# Patient Record
Sex: MALE | Race: WHITE | NOT HISPANIC OR LATINO | Employment: OTHER | URBAN - METROPOLITAN AREA
[De-identification: names, ages, dates, MRNs, and addresses within clinical notes are randomized per-mention and may not be internally consistent; named-entity substitution may affect disease eponyms.]

---

## 2023-05-12 LAB
ALANINE AMINOTRANSFERASE (SGPT) (U/L) IN SER/PLAS: 19 U/L (ref 10–52)
ALBUMIN (G/DL) IN SER/PLAS: 4.3 G/DL (ref 3.4–5)
ALKALINE PHOSPHATASE (U/L) IN SER/PLAS: 53 U/L (ref 33–136)
ANION GAP IN SER/PLAS: 12 MMOL/L (ref 10–20)
ASPARTATE AMINOTRANSFERASE (SGOT) (U/L) IN SER/PLAS: 20 U/L (ref 9–39)
BILIRUBIN TOTAL (MG/DL) IN SER/PLAS: 0.6 MG/DL (ref 0–1.2)
CALCIUM (MG/DL) IN SER/PLAS: 9.8 MG/DL (ref 8.6–10.3)
CARBON DIOXIDE, TOTAL (MMOL/L) IN SER/PLAS: 26 MMOL/L (ref 21–32)
CHLORIDE (MMOL/L) IN SER/PLAS: 106 MMOL/L (ref 98–107)
CHOLESTEROL (MG/DL) IN SER/PLAS: 172 MG/DL (ref 0–199)
CHOLESTEROL IN HDL (MG/DL) IN SER/PLAS: 47.8 MG/DL
CHOLESTEROL/HDL RATIO: 3.6
CREATININE (MG/DL) IN SER/PLAS: 0.99 MG/DL (ref 0.5–1.3)
ERYTHROCYTE DISTRIBUTION WIDTH (RATIO) BY AUTOMATED COUNT: 13.2 % (ref 11.5–14.5)
ERYTHROCYTE MEAN CORPUSCULAR HEMOGLOBIN CONCENTRATION (G/DL) BY AUTOMATED: 30.9 G/DL (ref 32–36)
ERYTHROCYTE MEAN CORPUSCULAR VOLUME (FL) BY AUTOMATED COUNT: 90 FL (ref 80–100)
ERYTHROCYTES (10*6/UL) IN BLOOD BY AUTOMATED COUNT: 5.41 X10E12/L (ref 4.5–5.9)
GFR MALE: 79 ML/MIN/1.73M2
GLUCOSE (MG/DL) IN SER/PLAS: 76 MG/DL (ref 74–99)
HEMATOCRIT (%) IN BLOOD BY AUTOMATED COUNT: 48.9 % (ref 41–52)
HEMOGLOBIN (G/DL) IN BLOOD: 15.1 G/DL (ref 13.5–17.5)
LDL: 90 MG/DL (ref 0–99)
LEUKOCYTES (10*3/UL) IN BLOOD BY AUTOMATED COUNT: 6.5 X10E9/L (ref 4.4–11.3)
PLATELETS (10*3/UL) IN BLOOD AUTOMATED COUNT: 288 X10E9/L (ref 150–450)
POTASSIUM (MMOL/L) IN SER/PLAS: 4.3 MMOL/L (ref 3.5–5.3)
PROSTATE SPECIFIC ANTIGEN,SCREEN: <0.01 NG/ML (ref 0–4)
PROTEIN TOTAL: 7.4 G/DL (ref 6.4–8.2)
SODIUM (MMOL/L) IN SER/PLAS: 140 MMOL/L (ref 136–145)
TRIGLYCERIDE (MG/DL) IN SER/PLAS: 172 MG/DL (ref 0–149)
UREA NITROGEN (MG/DL) IN SER/PLAS: 16 MG/DL (ref 6–23)
VLDL: 34 MG/DL (ref 0–40)

## 2024-04-12 ENCOUNTER — TELEPHONE (OUTPATIENT)
Dept: PRIMARY CARE | Facility: CLINIC | Age: 79
End: 2024-04-12
Payer: MEDICARE

## 2024-04-12 DIAGNOSIS — Z13.220 SCREENING FOR LIPID DISORDERS: ICD-10-CM

## 2024-04-12 DIAGNOSIS — Z85.46 HISTORY OF PROSTATE CANCER: ICD-10-CM

## 2024-04-12 DIAGNOSIS — Z00.00 HEALTHCARE MAINTENANCE: ICD-10-CM

## 2024-05-22 ENCOUNTER — LAB (OUTPATIENT)
Dept: LAB | Facility: LAB | Age: 79
End: 2024-05-22
Payer: MEDICARE

## 2024-05-22 DIAGNOSIS — Z85.46 HISTORY OF PROSTATE CANCER: ICD-10-CM

## 2024-05-22 DIAGNOSIS — Z13.220 SCREENING FOR LIPID DISORDERS: ICD-10-CM

## 2024-05-22 DIAGNOSIS — Z00.00 HEALTHCARE MAINTENANCE: ICD-10-CM

## 2024-05-22 LAB
ALBUMIN SERPL BCP-MCNC: 4.4 G/DL (ref 3.4–5)
ALP SERPL-CCNC: 53 U/L (ref 33–136)
ALT SERPL W P-5'-P-CCNC: 17 U/L (ref 10–52)
ANION GAP SERPL CALC-SCNC: 13 MMOL/L (ref 10–20)
AST SERPL W P-5'-P-CCNC: 18 U/L (ref 9–39)
BILIRUB SERPL-MCNC: 0.5 MG/DL (ref 0–1.2)
BUN SERPL-MCNC: 20 MG/DL (ref 6–23)
CALCIUM SERPL-MCNC: 9.4 MG/DL (ref 8.6–10.3)
CHLORIDE SERPL-SCNC: 106 MMOL/L (ref 98–107)
CHOLEST SERPL-MCNC: 181 MG/DL (ref 0–199)
CHOLESTEROL/HDL RATIO: 4.5
CO2 SERPL-SCNC: 26 MMOL/L (ref 21–32)
CREAT SERPL-MCNC: 0.96 MG/DL (ref 0.5–1.3)
EGFRCR SERPLBLD CKD-EPI 2021: 81 ML/MIN/1.73M*2
ERYTHROCYTE [DISTWIDTH] IN BLOOD BY AUTOMATED COUNT: 12.9 % (ref 11.5–14.5)
GLUCOSE SERPL-MCNC: 77 MG/DL (ref 74–99)
HCT VFR BLD AUTO: 50.1 % (ref 41–52)
HDLC SERPL-MCNC: 40.3 MG/DL
HGB BLD-MCNC: 15.5 G/DL (ref 13.5–17.5)
LDLC SERPL CALC-MCNC: 111 MG/DL
MCH RBC QN AUTO: 28.7 PG (ref 26–34)
MCHC RBC AUTO-ENTMCNC: 30.9 G/DL (ref 32–36)
MCV RBC AUTO: 93 FL (ref 80–100)
NON HDL CHOLESTEROL: 141 MG/DL (ref 0–149)
NRBC BLD-RTO: 0 /100 WBCS (ref 0–0)
PLATELET # BLD AUTO: 339 X10*3/UL (ref 150–450)
POTASSIUM SERPL-SCNC: 4.7 MMOL/L (ref 3.5–5.3)
PROT SERPL-MCNC: 6.8 G/DL (ref 6.4–8.2)
PSA SERPL-MCNC: 0.01 NG/ML
RBC # BLD AUTO: 5.4 X10*6/UL (ref 4.5–5.9)
SODIUM SERPL-SCNC: 140 MMOL/L (ref 136–145)
TRIGL SERPL-MCNC: 151 MG/DL (ref 0–149)
VLDL: 30 MG/DL (ref 0–40)
WBC # BLD AUTO: 5.8 X10*3/UL (ref 4.4–11.3)

## 2024-05-22 PROCEDURE — 80053 COMPREHEN METABOLIC PANEL: CPT

## 2024-05-22 PROCEDURE — 80061 LIPID PANEL: CPT

## 2024-05-22 PROCEDURE — 85027 COMPLETE CBC AUTOMATED: CPT

## 2024-05-22 PROCEDURE — 36415 COLL VENOUS BLD VENIPUNCTURE: CPT

## 2024-05-22 PROCEDURE — G0103 PSA SCREENING: HCPCS

## 2024-05-28 ENCOUNTER — APPOINTMENT (OUTPATIENT)
Dept: PRIMARY CARE | Facility: CLINIC | Age: 79
End: 2024-05-28
Payer: MEDICARE

## 2024-05-29 ENCOUNTER — OFFICE VISIT (OUTPATIENT)
Dept: PRIMARY CARE | Facility: CLINIC | Age: 79
End: 2024-05-29
Payer: MEDICARE

## 2024-05-29 VITALS
SYSTOLIC BLOOD PRESSURE: 138 MMHG | WEIGHT: 194 LBS | HEIGHT: 74 IN | OXYGEN SATURATION: 99 % | BODY MASS INDEX: 24.9 KG/M2 | DIASTOLIC BLOOD PRESSURE: 80 MMHG | HEART RATE: 75 BPM

## 2024-05-29 DIAGNOSIS — Z13.6 ENCOUNTER FOR SCREENING FOR CORONARY ARTERY DISEASE: ICD-10-CM

## 2024-05-29 DIAGNOSIS — E78.00 PURE HYPERCHOLESTEROLEMIA: ICD-10-CM

## 2024-05-29 DIAGNOSIS — R03.0 ELEVATED BP WITHOUT DIAGNOSIS OF HYPERTENSION: ICD-10-CM

## 2024-05-29 DIAGNOSIS — Z00.00 ROUTINE GENERAL MEDICAL EXAMINATION AT A HEALTH CARE FACILITY: Primary | ICD-10-CM

## 2024-05-29 PROCEDURE — 1036F TOBACCO NON-USER: CPT | Performed by: FAMILY MEDICINE

## 2024-05-29 PROCEDURE — G0439 PPPS, SUBSEQ VISIT: HCPCS | Performed by: FAMILY MEDICINE

## 2024-05-29 PROCEDURE — 1158F ADVNC CARE PLAN TLK DOCD: CPT | Performed by: FAMILY MEDICINE

## 2024-05-29 PROCEDURE — 1170F FXNL STATUS ASSESSED: CPT | Performed by: FAMILY MEDICINE

## 2024-05-29 PROCEDURE — 1123F ACP DISCUSS/DSCN MKR DOCD: CPT | Performed by: FAMILY MEDICINE

## 2024-05-29 PROCEDURE — 99214 OFFICE O/P EST MOD 30 MIN: CPT | Performed by: FAMILY MEDICINE

## 2024-05-29 PROCEDURE — 1159F MED LIST DOCD IN RCRD: CPT | Performed by: FAMILY MEDICINE

## 2024-05-29 RX ORDER — MULTIVITAMIN
1 TABLET ORAL
COMMUNITY

## 2024-05-29 ASSESSMENT — ACTIVITIES OF DAILY LIVING (ADL)
BATHING: INDEPENDENT
TAKING_MEDICATION: INDEPENDENT
DOING_HOUSEWORK: INDEPENDENT
MANAGING_FINANCES: INDEPENDENT
DRESSING: INDEPENDENT
GROCERY_SHOPPING: INDEPENDENT

## 2024-05-29 ASSESSMENT — ENCOUNTER SYMPTOMS
LOSS OF SENSATION IN FEET: 0
OCCASIONAL FEELINGS OF UNSTEADINESS: 0
DEPRESSION: 0

## 2024-05-29 ASSESSMENT — PATIENT HEALTH QUESTIONNAIRE - PHQ9
2. FEELING DOWN, DEPRESSED OR HOPELESS: NOT AT ALL
1. LITTLE INTEREST OR PLEASURE IN DOING THINGS: NOT AT ALL
SUM OF ALL RESPONSES TO PHQ9 QUESTIONS 1 AND 2: 0

## 2024-05-29 NOTE — PROGRESS NOTES
"Subjective   Reason for Visit: Chencho Conn is an 78 y.o. male here for a Medicare Wellness visit.          Reviewed all medications by prescribing practitioner or clinical pharmacist (such as prescriptions, OTCs, herbal therapies and supplements) and documented in the medical record.    HPI Patient had fasting labs drawn prior to office visit. Patients mother was diagnosed with Colon cancer in 1989. Patient was diagnoses with Prostate cancer in 2013 and had Prostatectomy. Patient had Colonoscopy done on 6/22/2023. Pt was advised no cancer -- repeat 5 years.    Patient Care Team:  Chip Hernandez DO as PCP - General  Chip Hernandez DO as PCP - [employer] PCP     Review of Systems   All other systems reviewed and are negative.      Objective   Vitals:  /80   Pulse 75   Ht 1.867 m (6' 1.5\")   Wt 88 kg (194 lb)   SpO2 99%   BMI 25.25 kg/m²       Physical Exam  Constitutional:       Appearance: Normal appearance.   Eyes:      Conjunctiva/sclera: Conjunctivae normal.   Cardiovascular:      Rate and Rhythm: Normal rate and regular rhythm.   Pulmonary:      Effort: Pulmonary effort is normal.      Breath sounds: Normal breath sounds.   Abdominal:      Palpations: Abdomen is soft.   Musculoskeletal:         General: Normal range of motion.   Skin:     General: Skin is warm and dry.   Neurological:      Mental Status: He is alert.   Psychiatric:         Mood and Affect: Mood normal.         Assessment/Plan   Problem List Items Addressed This Visit    None  Visit Diagnoses       Routine general medical examination at a health care facility    -  Primary    Encounter for screening for coronary artery disease        Relevant Orders    CT cardiac scoring wo IV contrast    Pure hypercholesterolemia        Elevated BP without diagnosis of hypertension                 Discussed starting medication for elevated BP and hyperlipidemia, however, he would prefer to see results from CAC and we will " then make that decision as appropriate. He'll schedule follow up ASAP once completed.

## 2024-07-06 ENCOUNTER — HOSPITAL ENCOUNTER (OUTPATIENT)
Dept: RADIOLOGY | Facility: HOSPITAL | Age: 79
Discharge: HOME | End: 2024-07-06
Payer: MEDICARE

## 2024-07-06 DIAGNOSIS — Z13.6 ENCOUNTER FOR SCREENING FOR CORONARY ARTERY DISEASE: ICD-10-CM

## 2024-07-06 PROCEDURE — 75571 CT HRT W/O DYE W/CA TEST: CPT

## 2024-07-10 DIAGNOSIS — R93.1 ABNORMAL HEART SCORE CT: ICD-10-CM

## 2024-07-10 PROBLEM — U07.1 COVID-19: Status: ACTIVE | Noted: 2024-07-10

## 2024-07-10 PROBLEM — R10.2 PELVIC PAIN IN MALE: Status: ACTIVE | Noted: 2024-07-10

## 2024-07-10 PROBLEM — B02.9 HERPES ZOSTER: Status: ACTIVE | Noted: 2024-07-10

## 2024-07-10 PROBLEM — N40.0 BENIGN PROSTATIC HYPERPLASIA: Status: ACTIVE | Noted: 2024-07-10

## 2024-07-10 PROBLEM — R42 VERTIGO: Status: ACTIVE | Noted: 2024-07-10
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
## 2024-07-10 PROBLEM — E78.5 DYSLIPIDEMIA: Status: ACTIVE | Noted: 2024-07-10

## 2024-07-10 PROBLEM — M54.2 CHRONIC NECK PAIN: Status: ACTIVE | Noted: 2024-07-10

## 2024-07-10 PROBLEM — G89.29 CHRONIC NECK PAIN: Status: ACTIVE | Noted: 2024-07-10

## 2024-07-10 PROBLEM — R19.09 GROIN MASS: Status: ACTIVE | Noted: 2024-07-10

## 2024-07-10 PROBLEM — M25.512 ACUTE PAIN OF LEFT SHOULDER: Status: ACTIVE | Noted: 2024-07-10

## 2024-07-10 PROBLEM — S39.011A ABDOMINAL MUSCLE STRAIN: Status: ACTIVE | Noted: 2024-07-10

## 2024-07-10 PROBLEM — M25.559 ARTHRALGIA OF HIP: Status: ACTIVE | Noted: 2024-07-10

## 2024-07-10 PROBLEM — Z85.46 HISTORY OF MALIGNANT NEOPLASM OF PROSTATE: Status: ACTIVE | Noted: 2023-12-11

## 2024-07-10 PROBLEM — H61.22 EXCESSIVE CERUMEN IN EAR CANAL, LEFT: Status: ACTIVE | Noted: 2024-07-10

## 2024-07-10 RX ORDER — ROSUVASTATIN CALCIUM 40 MG/1
40 TABLET, COATED ORAL DAILY
Qty: 30 TABLET | Refills: 0 | Status: SHIPPED | OUTPATIENT
Start: 2024-07-10 | End: 2024-08-09

## 2024-07-10 NOTE — TELEPHONE ENCOUNTER
Per provider, based off of CT Cardiac scoring results, patient needs referral to Cardiology ASAP. ER is he develops chest pain, headaches, SOB, dizziness. Also patient needs to start Crestor 40mg.     Spoke with Cardiology Dr. Maldonado on 7/12/2024 at 2pm at the [facility] location. Referral has been placed please sign  Also Crestor 40mg placed. Please approve.     Patient informed via phone of test results, scheduled referral. Patient also verbally understands to go to Emergency Room if he develops chest pain, SOB, headaches and/or dizziness. Also stated he has tried statins in the past which caused myalgia but he is willing to try them again.

## 2024-07-12 ENCOUNTER — OFFICE VISIT (OUTPATIENT)
Dept: CARDIOLOGY | Facility: CLINIC | Age: 79
End: 2024-07-12
Payer: MEDICARE

## 2024-07-12 VITALS
BODY MASS INDEX: 25.03 KG/M2 | WEIGHT: 195 LBS | HEIGHT: 74 IN | HEART RATE: 70 BPM | SYSTOLIC BLOOD PRESSURE: 146 MMHG | DIASTOLIC BLOOD PRESSURE: 66 MMHG

## 2024-07-12 DIAGNOSIS — R93.1 ELEVATED CORONARY ARTERY CALCIUM SCORE: Primary | ICD-10-CM

## 2024-07-12 DIAGNOSIS — I49.3 FREQUENT PVCS: ICD-10-CM

## 2024-07-12 DIAGNOSIS — I25.10 ASHD (ARTERIOSCLEROTIC HEART DISEASE): ICD-10-CM

## 2024-07-12 DIAGNOSIS — E78.5 DYSLIPIDEMIA: ICD-10-CM

## 2024-07-12 PROCEDURE — 99205 OFFICE O/P NEW HI 60 MIN: CPT | Performed by: INTERNAL MEDICINE

## 2024-07-12 PROCEDURE — 1036F TOBACCO NON-USER: CPT | Performed by: INTERNAL MEDICINE

## 2024-07-12 PROCEDURE — 93000 ELECTROCARDIOGRAM COMPLETE: CPT | Performed by: INTERNAL MEDICINE

## 2024-07-12 PROCEDURE — 1159F MED LIST DOCD IN RCRD: CPT | Performed by: INTERNAL MEDICINE

## 2024-07-12 RX ORDER — BUTYROSPERMUM PARKII(SHEA BUTTER), SIMMONDSIA CHINENSIS (JOJOBA) SEED OIL, ALOE BARBADENSIS LEAF EXTRACT .01; 1; 3.5 G/100G; G/100G; G/100G
250 LIQUID TOPICAL DAILY
COMMUNITY

## 2024-07-12 RX ORDER — FLUTICASONE PROPIONATE 50 MCG
1 SPRAY, SUSPENSION (ML) NASAL DAILY
COMMUNITY

## 2024-07-12 RX ORDER — ACETAMINOPHEN, DIPHENHYDRAMINE HCL, PHENYLEPHRINE HCL 325; 25; 5 MG/1; MG/1; MG/1
10 TABLET ORAL NIGHTLY
COMMUNITY

## 2024-07-12 NOTE — PATIENT INSTRUCTIONS
Thanks for following up in office today.    1)  Your CT calcium score is elevated.  This test is used as a screening tool for patients who do not have any symptoms.  One possibility is that the calcium is spread along the vessel, another is that the calcium could be outside the vessel and the other is that there are heart artery blockages.  For people who are not having any symptoms it is best to start with a stress test.  Stress tests are used to find severe blockages.  The other test is a heart ultrasound called an echocardiogram.  This lets us know the strength of your heart, if you have any valve issues.    In the mean time avoid strenuous,  strenuous activities.    2)  We discussed your cholesterol blood work.  I do recommend that you start the rosuvastatin that Dr Hernandez has ordered for you.  I do recommend that you start taking aspirin 81 mg enteric coated daily.  Take this with food to help prevent any GI irritation.     3)  Please continue your cardiac medications as prescribed.    4) You asked about diet.  Go to heart.org this is the American Heart Association web site that you can research diet.    Follow up with me after testing.  If you have any questions, please call [phone] and choose option for Dr. Maldonado's nurse Dorys Bates

## 2024-07-12 NOTE — PROGRESS NOTES
Referred by Dr. Roy ref. provider found for No chief complaint on file.     History Of Present Illness:    Chencho Conn is a 78 y.o. male presenting as a new patient for elevated calcium score.    No prior history of CAD/MI/CHF/arrhythmia.  Denies chest pain, SOB,dizziness ,leg swelling, orthopnea, palps, syncope.  Tells me he walks a mile a day without significant exertional symptoms.   Has a history of BPH, DL, history of prostate CA, vertigo, chronic neck pain, squamous and melanoma, bells palsy, covid.    EKG in office today:  SR HR 70, frequent PVC's in a pattern of bigeminy, LAFB    SocHx:  Former smoker with of half to 3/4 pack a day for about 30 years.  Drinks 2-3 beers a day but not every day.    Family history: Mother end stage heart failure  age 96, Father with CHF,CABG   age 91. Paternal grandfather with possible MI.    CV testing and labs reviewed    CT CAC score 2024  , , Lcx 768    total 3  2024  Lipid labs chol 181 HDL 40.3   trig 151   CMP  K 4.7  BUN 20 crea 0.96 ast 18  alt 17  H&H 15.5  50.1  Past Medical History:  He has a past medical history of Other allergy status, other than to drugs and biological substances, Personal history of other diseases of the digestive system, and Prostate cancer (Multi).    Past Surgical History:  He has a past surgical history that includes Other surgical history (2022); Other surgical history (2022); Prostate surgery (2022); Hernia repair (2022); Cataract extraction (2022); Tonsillectomy (2022); Other surgical history (2022); and Other surgical history (2022).      Social History:  He reports that he has quit smoking. His smoking use included cigarettes. He has never used smokeless tobacco. He reports current alcohol use. He reports that he does not currently use drugs.    Family History:  Family History   Problem Relation Name Age of Onset    Colon cancer Mother           Allergies:  Amoxicillin and Statins-hmg-coa reductase inhibitors    Outpatient Medications:  Current Outpatient Medications   Medication Instructions    diphenhydrAMINE-acetaminophen (Tylenol PM)  mg per tablet 1 tablet, oral, Nightly PRN    multivitamin tablet 1 tablet, oral, Daily RT    rosuvastatin (CRESTOR) 40 mg, oral, Daily        Last Recorded Vitals:  There were no vitals filed for this visit.    Physical Exam:  Physical Exam  HENT:      Head: Normocephalic.   Cardiovascular:      Rate and Rhythm: Normal rate and regular rhythm.      Heart sounds: S1 normal and S2 normal.      Comments: 1/6 systolic murmur RUSB   No carotid bruits  Bilateral radial pulses +2/4  No leg swelling   No JVD at 90 degrees   Pulmonary:      Effort: Pulmonary effort is normal.      Breath sounds: Normal breath sounds.   Abdominal:      Palpations: Abdomen is soft.   Musculoskeletal:         General: Normal range of motion.      Cervical back: Normal range of motion.   Skin:     General: Skin is warm and dry.   Neurological:      General: No focal deficit present.      Mental Status: He is alert.   Psychiatric:         Mood and Affect: Mood normal.           Last Labs:  CBC -  Lab Results   Component Value Date    WBC 5.8 05/22/2024    HGB 15.5 05/22/2024    HCT 50.1 05/22/2024    MCV 93 05/22/2024     05/22/2024       CMP -  Lab Results   Component Value Date    CALCIUM 9.4 05/22/2024    PROT 6.8 05/22/2024    ALBUMIN 4.4 05/22/2024    AST 18 05/22/2024    ALT 17 05/22/2024    ALKPHOS 53 05/22/2024    BILITOT 0.5 05/22/2024       LIPID PANEL -   Lab Results   Component Value Date    CHOL 181 05/22/2024    TRIG 151 (H) 05/22/2024    HDL 40.3 05/22/2024    CHHDL 4.5 05/22/2024    LDLF 90 05/12/2023    VLDL 30 05/22/2024    NHDL 141 05/22/2024       RENAL FUNCTION PANEL -   Lab Results   Component Value Date    GLUCOSE 77 05/22/2024     05/22/2024    K 4.7 05/22/2024     05/22/2024    CO2 26 05/22/2024  "   ANIONGAP 13 05/22/2024    BUN 20 05/22/2024    CREATININE 0.96 05/22/2024    GFRMALE 79 05/12/2023    CALCIUM 9.4 05/22/2024    ALBUMIN 4.4 05/22/2024        No results found for: \"BNP\", \"HGBA1C\"              Assessment/Plan   Elevated CAC Score  Frequent PVC's, bigeminy  Borderline DL  Former tobacco use    Elevated CAC score > 2000, placing patient in a high risk group to have CV events.  He is otherwise asymptomatic with what sounds like a light to moderate level of exertion.  We discussed further evaluation with stress test and echo.    Recs:  -check TTE  -check treadmill MPI stress test  -rosuvastatin 40mg daily, discussed side effects  -ASA 81mg daily  -discussed healthy cardiac lifestyle mods including mediterranean style diet, low sodium, patient and wife given [facility] website for assistance with this - offered nutritionist referral if they should desire  -asymptomatic frequent PVC's - will check TTE for LV fcn, - consider holter for PVC burden       "

## 2024-07-16 ENCOUNTER — APPOINTMENT (OUTPATIENT)
Dept: PRIMARY CARE | Facility: CLINIC | Age: 79
End: 2024-07-16
Payer: MEDICARE

## 2024-07-29 ENCOUNTER — HOSPITAL ENCOUNTER (OUTPATIENT)
Dept: CARDIOLOGY | Facility: HOSPITAL | Age: 79
Discharge: HOME | End: 2024-07-29
Payer: MEDICARE

## 2024-07-29 ENCOUNTER — HOSPITAL ENCOUNTER (OUTPATIENT)
Dept: RADIOLOGY | Facility: HOSPITAL | Age: 79
Discharge: HOME | End: 2024-07-29
Payer: MEDICARE

## 2024-07-29 DIAGNOSIS — I25.10 ASHD (ARTERIOSCLEROTIC HEART DISEASE): ICD-10-CM

## 2024-07-29 DIAGNOSIS — R93.1 ELEVATED CORONARY ARTERY CALCIUM SCORE: ICD-10-CM

## 2024-07-29 PROCEDURE — A9502 TC99M TETROFOSMIN: HCPCS | Performed by: INTERNAL MEDICINE

## 2024-07-29 PROCEDURE — 93018 CV STRESS TEST I&R ONLY: CPT | Performed by: INTERNAL MEDICINE

## 2024-07-29 PROCEDURE — 93016 CV STRESS TEST SUPVJ ONLY: CPT | Performed by: INTERNAL MEDICINE

## 2024-07-29 PROCEDURE — 93306 TTE W/DOPPLER COMPLETE: CPT

## 2024-07-29 PROCEDURE — 93017 CV STRESS TEST TRACING ONLY: CPT

## 2024-07-29 PROCEDURE — 3430000001 HC RX 343 DIAGNOSTIC RADIOPHARMACEUTICALS: Performed by: INTERNAL MEDICINE

## 2024-07-29 PROCEDURE — 93306 TTE W/DOPPLER COMPLETE: CPT | Performed by: INTERNAL MEDICINE

## 2024-07-29 PROCEDURE — 78452 HT MUSCLE IMAGE SPECT MULT: CPT

## 2024-07-30 LAB
EJECTION FRACTION APICAL 4 CHAMBER: 59.9
EJECTION FRACTION: 58 %
LEFT ATRIUM VOLUME AREA LENGTH INDEX BSA: 26.7 ML/M2
LEFT VENTRICLE INTERNAL DIMENSION DIASTOLE: 3.45 CM (ref 3.5–6)
LEFT VENTRICULAR OUTFLOW TRACT DIAMETER: 2.43 CM
LV EJECTION FRACTION BIPLANE: 58 %
MITRAL VALVE E/A RATIO: 0.53
RIGHT VENTRICLE FREE WALL PEAK S': 16.14 CM/S
TRICUSPID ANNULAR PLANE SYSTOLIC EXCURSION: 2.6 CM

## 2024-08-09 DIAGNOSIS — R93.1 ABNORMAL HEART SCORE CT: ICD-10-CM

## 2024-08-09 RX ORDER — ROSUVASTATIN CALCIUM 40 MG/1
40 TABLET, COATED ORAL DAILY
Qty: 30 TABLET | Refills: 1 | Status: SHIPPED | OUTPATIENT
Start: 2024-08-09 | End: 2024-10-08

## 2024-09-11 RX ORDER — ASPIRIN 81 MG/1
81 TABLET ORAL DAILY
COMMUNITY

## 2024-09-17 ENCOUNTER — APPOINTMENT (OUTPATIENT)
Dept: CARDIOLOGY | Facility: CLINIC | Age: 79
End: 2024-09-17
Payer: MEDICARE

## 2024-09-17 VITALS
BODY MASS INDEX: 24.51 KG/M2 | DIASTOLIC BLOOD PRESSURE: 78 MMHG | SYSTOLIC BLOOD PRESSURE: 162 MMHG | WEIGHT: 191 LBS | HEIGHT: 74 IN | HEART RATE: 74 BPM

## 2024-09-17 DIAGNOSIS — I11.9 BENIGN HYPERTENSIVE HEART DISEASE WITHOUT HEART FAILURE: ICD-10-CM

## 2024-09-17 DIAGNOSIS — E78.5 DYSLIPIDEMIA: Primary | ICD-10-CM

## 2024-09-17 DIAGNOSIS — R93.1 ELEVATED CORONARY ARTERY CALCIUM SCORE: ICD-10-CM

## 2024-09-17 DIAGNOSIS — R93.1 ABNORMAL HEART SCORE CT: ICD-10-CM

## 2024-09-17 PROCEDURE — 93010 ELECTROCARDIOGRAM REPORT: CPT | Performed by: INTERNAL MEDICINE

## 2024-09-17 PROCEDURE — 1036F TOBACCO NON-USER: CPT | Performed by: INTERNAL MEDICINE

## 2024-09-17 PROCEDURE — 99214 OFFICE O/P EST MOD 30 MIN: CPT | Performed by: INTERNAL MEDICINE

## 2024-09-17 PROCEDURE — 93005 ELECTROCARDIOGRAM TRACING: CPT | Performed by: INTERNAL MEDICINE

## 2024-09-17 PROCEDURE — 1159F MED LIST DOCD IN RCRD: CPT | Performed by: INTERNAL MEDICINE

## 2024-09-17 RX ORDER — ROSUVASTATIN CALCIUM 20 MG/1
20 TABLET, COATED ORAL DAILY
Qty: 15 TABLET | Refills: 11 | Status: SHIPPED | OUTPATIENT
Start: 2024-09-17 | End: 2025-09-17

## 2024-09-17 RX ORDER — TRIAMCINOLONE ACETONIDE 1 MG/G
CREAM TOPICAL 2 TIMES DAILY
COMMUNITY
Start: 2024-09-09

## 2024-09-17 NOTE — PROGRESS NOTES
"Chief Complaint:   No chief complaint on file.     History Of Present Illness:    Chencho Conn is a 79 y.o. male presenting for follow up after testing.  Has an elevated CAC score.      Telling me he has had a worsening of his chronic aches and pains after starting rosuvastatin 40mg daily.  He denies any chest pain/pressure or worsening shortness of breath with exertion.  He does report occasional palpitations, but no dizziness/syncope/presyncope.      ROS:  The remainder of the review of systems was obtained, as was negative as pertains to the chief complaint.    CV testing and labs :    Stress test 7/2024  Impression   7 METs, 87% mphr.  Normal exercise stress myocardial perfusion imaging. Mild inferior  attenuation seen in the supine images that improves on prone imaging,  likely soft tissue attenuation.       TTE 7/2024  EF 58% increased wall thickness,trivial aortic valve regurg,trace mitral, tricuspid,pulmonic  regurg,      CT CAC score 7/2024  , , Lcx 768    total 2093  5/2024  Lipid labs chol 181 HDL 40.3   trig 151   CMP  K 4.7  BUN 20 crea 0.96 ast 18  alt 17  H&H 15.5  50.1     Last Recorded Vitals:  Vitals:    09/17/24 1011   BP: 162/78   Pulse: 74   Weight: 86.6 kg (191 lb)   Height: 1.867 m (6' 1.5\")       Past Medical History:  He has a past medical history of Other allergy status, other than to drugs and biological substances, Personal history of other diseases of the digestive system, and Prostate cancer (Multi).    Past Surgical History:  He has a past surgical history that includes Other surgical history (04/18/2022); Other surgical history (04/18/2022); Prostate surgery (04/18/2022); Hernia repair (04/18/2022); Cataract extraction (04/18/2022); Tonsillectomy (04/18/2022); Other surgical history (04/18/2022); and Other surgical history (04/18/2022).      Social History:  He reports that he has quit smoking. His smoking use included cigarettes. He has never used " smokeless tobacco. He reports current alcohol use. He reports that he does not currently use drugs.    Family History:  Family History   Problem Relation Name Age of Onset    Colon cancer Mother          Allergies:  Amoxicillin and Statins-hmg-coa reductase inhibitors    Outpatient Medications:  Current Outpatient Medications   Medication Instructions    aspirin 81 mg, oral, Daily    diphenhydrAMINE-acetaminophen (Tylenol PM)  mg per tablet 1 tablet, oral, Nightly PRN    fluticasone (Flonase) 50 mcg/actuation nasal spray 1 spray, Each Nostril, Daily, Shake gently. Before first use, prime pump. After use, clean tip and replace cap.    melatonin 10 mg, oral, Nightly    multivitamin tablet 1 tablet, oral, Daily RT    rosuvastatin (CRESTOR) 40 mg, oral, Daily    saccharomyces boulardii (FLORASTOR) 250 mg, oral, Daily    triamcinolone (Kenalog) 0.1 % cream Topical, 2 times daily       Physical Exam:  Physical Exam  HENT:      Head: Normocephalic.      Nose: Nose normal.      Mouth/Throat:      Mouth: Mucous membranes are moist.   Cardiovascular:      Rate and Rhythm: Normal rate and regular rhythm.      Heart sounds: S1 normal and S2 normal.      Comments: NO CAROTID BRUITS  NO LEG SWELLING  Pulmonary:      Effort: Pulmonary effort is normal.      Breath sounds: Normal breath sounds.   Abdominal:      Palpations: Abdomen is soft.   Musculoskeletal:         General: Normal range of motion.      Cervical back: Normal range of motion.   Skin:     General: Skin is warm and dry.   Neurological:      General: No focal deficit present.      Mental Status: He is alert.   Psychiatric:         Mood and Affect: Mood normal.              Last Labs:  CBC -  Lab Results   Component Value Date    WBC 5.8 05/22/2024    HGB 15.5 05/22/2024    HCT 50.1 05/22/2024    MCV 93 05/22/2024     05/22/2024       CMP -  Lab Results   Component Value Date    CALCIUM 9.4 05/22/2024    PROT 6.8 05/22/2024    ALBUMIN 4.4 05/22/2024    AST  "18 05/22/2024    ALT 17 05/22/2024    ALKPHOS 53 05/22/2024    BILITOT 0.5 05/22/2024       LIPID PANEL -   Lab Results   Component Value Date    CHOL 181 05/22/2024    TRIG 151 (H) 05/22/2024    HDL 40.3 05/22/2024    CHHDL 4.5 05/22/2024    LDLF 90 05/12/2023    VLDL 30 05/22/2024    NHDL 141 05/22/2024       RENAL FUNCTION PANEL -   Lab Results   Component Value Date    GLUCOSE 77 05/22/2024     05/22/2024    K 4.7 05/22/2024     05/22/2024    CO2 26 05/22/2024    ANIONGAP 13 05/22/2024    BUN 20 05/22/2024    CREATININE 0.96 05/22/2024    GFRMALE 79 05/12/2023    CALCIUM 9.4 05/22/2024    ALBUMIN 4.4 05/22/2024        No results found for: \"BNP\", \"HGBA1C\"          Assessment/Plan   Elevated CAC Score - stress MPI without clear e/o ischemia  Frequent PVC's, bigeminy  Severe septal hypertrophy on TTE - without mention of LVOT gradient  Borderline DL  Former tobacco use - quit 1995     Elevated CAC score > 2000, placing patient in a high risk group to have CV events.  He is otherwise asymptomatic with what sounds like a light to moderate level of exertion.    At this time I am recommending ongoing medical therapy for ASHD.  He is to let us know if he develops any angina/anginal equivalents.  He does have some aching on rosuva 40mg daily - we will trial at 20mg daily and see if they improve.     Recs:  -reduce rosuvastatin to 20mg daily and surveil symptoms  -ASA 81mg daily  -discussed healthy cardiac lifestyle mods including mediterranean style diet, low sodium, patient and wife given AHA website for assistance with this - offered nutritionist referral if they should desire  -asymptomatic frequent PVC's with normal LVEF - with severe septal hypertrophy, I am recommending holter - they have deferred for now  -advise avoid dehydration with the above       "

## 2024-09-17 NOTE — PATIENT INSTRUCTIONS
Thanks for following up in office today.    1)  You are telling me about increase in aches after starting the crestor.  You can try a reduced dose of 20 mg daily. I am sending in a new script for 20 mg daily.  I am also recommending that you try taking co-enzyme q 10 200 mg daily.  Try this lower dose for two weeks and see how you do. If you are still having increase aches and pains then you can try taking it every other day. Let us know how you are doing on this medication.     2)  We talked about your stress test results as well as your echocardiogram.  Your echo did show that your septal wall is thickened. We see this when blood pressure is not well controlled.  I want to work on good control of your pressure and repeat your echo.  You did bring in a log from home.    I want you to come back and see me in about two weeks with your home blood  pressure cuff and your log.  The other thing that I usually do if you have a thickened wall of your heart is have you wear a heart monitor  to see if you are having any abnormal heart rhythms.     3)  Please continue your cardiac medications as prescribed.    Follow up with MD/RN blood pressure visit in -2 weeks  If you have any questions, please call [phone] and choose option for Dr. Maldonado's nurse Dorys Bates

## 2024-10-22 ENCOUNTER — OFFICE VISIT (OUTPATIENT)
Dept: CARDIOLOGY | Facility: HOSPITAL | Age: 79
End: 2024-10-22
Payer: MEDICARE

## 2024-10-22 VITALS
HEIGHT: 74 IN | HEART RATE: 73 BPM | SYSTOLIC BLOOD PRESSURE: 161 MMHG | WEIGHT: 191 LBS | BODY MASS INDEX: 24.51 KG/M2 | DIASTOLIC BLOOD PRESSURE: 76 MMHG

## 2024-10-22 DIAGNOSIS — E78.5 DYSLIPIDEMIA: ICD-10-CM

## 2024-10-22 DIAGNOSIS — R93.1 ELEVATED CORONARY ARTERY CALCIUM SCORE: ICD-10-CM

## 2024-10-22 DIAGNOSIS — R93.1 ABNORMAL HEART SCORE CT: ICD-10-CM

## 2024-10-22 DIAGNOSIS — I11.9 BENIGN HYPERTENSIVE HEART DISEASE WITHOUT HEART FAILURE: ICD-10-CM

## 2024-10-22 PROCEDURE — 99214 OFFICE O/P EST MOD 30 MIN: CPT | Performed by: INTERNAL MEDICINE

## 2024-10-22 PROCEDURE — 1159F MED LIST DOCD IN RCRD: CPT | Performed by: INTERNAL MEDICINE

## 2024-10-22 PROCEDURE — 93005 ELECTROCARDIOGRAM TRACING: CPT | Performed by: INTERNAL MEDICINE

## 2024-10-22 PROCEDURE — 93010 ELECTROCARDIOGRAM REPORT: CPT | Performed by: INTERNAL MEDICINE

## 2024-10-22 PROCEDURE — 1036F TOBACCO NON-USER: CPT | Performed by: INTERNAL MEDICINE

## 2024-10-22 NOTE — PROGRESS NOTES
Chief Complaint:   No chief complaint on file.     History Of Present Illness:    Chencho Conn is a 79 y.o. male presenting for one month follow up  Has a history of elevated CAC score (total score 2093), followed up with exercise nuclear stress test in 7/2024 which demonstrated normal MPI (mild inferior attenuation improves on prone imaging, likely soft tissue attenuation).  He is physically active to a moderate level and has no chest pain or anginal equivalents.      Today Chencho is telling me that after taking rosuvastatin 40mg daily he did develop muscle aches/pains, so he decreased the dose to 20mg daily.  Still having some milder aches, and plans to go to 20mg every other day.  Of note, he had a papular rash on his chest and was seeing a dermatologist who prescribed a cream for the rash - after he started taking OTC Coenzyme Q10 200mg he noticed spread of the pupular rash to his arms.  He stopped CoQ10 1-2 weeks ago, without improvement in the rash.    Continues to deny chest pain, SOB, DIGGS, LE edema, orthopnea, PND.  He does not smoke tobacco.    ROS:  The remainder of the review of systems was obtained, as was negative as pertains to the chief complaint.    CV testing and labs :    Stress test 7/2024  Impression   7 METs, 87% mphr.  Normal exercise stress myocardial perfusion imaging. Mild inferior  attenuation seen in the supine images that improves on prone imaging,  likely soft tissue attenuation.       TTE 7/2024  EF 58% increased wall thickness,trivial aortic valve regurg,trace mitral, tricuspid,pulmonic  regurg,       CT CAC score 7/2024  , , Lcx 768    total 2093 5/2024  Lipid labs chol 181 HDL 40.3   trig 151   CMP  K 4.7  BUN 20 crea 0.96 ast 18  alt 17  H&H 15.5  50.1     Last Recorded Vitals:  There were no vitals filed for this visit.    Past Medical History:  He has a past medical history of Other allergy status, other than to drugs and biological substances,  Personal history of other diseases of the digestive system, and Prostate cancer (Multi).    Past Surgical History:  He has a past surgical history that includes Other surgical history (04/18/2022); Other surgical history (04/18/2022); Prostate surgery (04/18/2022); Hernia repair (04/18/2022); Cataract extraction (04/18/2022); Tonsillectomy (04/18/2022); Other surgical history (04/18/2022); and Other surgical history (04/18/2022).      Social History:  He reports that he has quit smoking. His smoking use included cigarettes. He has never used smokeless tobacco. He reports current alcohol use. He reports that he does not currently use drugs.    Family History:  Family History   Problem Relation Name Age of Onset    Colon cancer Mother          Allergies:  Amoxicillin and Statins-hmg-coa reductase inhibitors    Outpatient Medications:  Current Outpatient Medications   Medication Instructions    aspirin 81 mg, oral, Daily    diphenhydrAMINE-acetaminophen (Tylenol PM)  mg per tablet 1 tablet, oral, Nightly PRN    fluticasone (Flonase) 50 mcg/actuation nasal spray 1 spray, Each Nostril, Daily, Shake gently. Before first use, prime pump. After use, clean tip and replace cap.    melatonin 10 mg, oral, Nightly    multivitamin tablet 1 tablet, oral, Daily RT    rosuvastatin (CRESTOR) 20 mg, oral, Daily    saccharomyces boulardii (FLORASTOR) 250 mg, oral, Daily    triamcinolone (Kenalog) 0.1 % cream Topical, 2 times daily       Physical Exam:  Physical Exam  HENT:      Head: Normocephalic.      Nose: Nose normal.      Mouth/Throat:      Mouth: Mucous membranes are moist.   Cardiovascular:      Rate and Rhythm: Normal rate.      Comments: 1-2/6 systolic murmur BUSB   No carotid bruits  Bilateral radial pulses +2/4  Regular rhythm with bigeminy or trigeminy rhythm noted  Pulmonary:      Effort: Pulmonary effort is normal.      Breath sounds: Normal breath sounds.   Abdominal:      Palpations: Abdomen is soft.  "  Musculoskeletal:         General: Normal range of motion.      Cervical back: Normal range of motion.   Skin:     General: Skin is warm and dry.   Neurological:      General: No focal deficit present.      Mental Status: He is alert.   Psychiatric:         Mood and Affect: Mood normal.            Last Labs:  CBC -  Lab Results   Component Value Date    WBC 5.8 05/22/2024    HGB 15.5 05/22/2024    HCT 50.1 05/22/2024    MCV 93 05/22/2024     05/22/2024       CMP -  Lab Results   Component Value Date    CALCIUM 9.4 05/22/2024    PROT 6.8 05/22/2024    ALBUMIN 4.4 05/22/2024    AST 18 05/22/2024    ALT 17 05/22/2024    ALKPHOS 53 05/22/2024    BILITOT 0.5 05/22/2024       LIPID PANEL -   Lab Results   Component Value Date    CHOL 181 05/22/2024    TRIG 151 (H) 05/22/2024    HDL 40.3 05/22/2024    CHHDL 4.5 05/22/2024    LDLF 90 05/12/2023    VLDL 30 05/22/2024    NHDL 141 05/22/2024       RENAL FUNCTION PANEL -   Lab Results   Component Value Date    GLUCOSE 77 05/22/2024     05/22/2024    K 4.7 05/22/2024     05/22/2024    CO2 26 05/22/2024    ANIONGAP 13 05/22/2024    BUN 20 05/22/2024    CREATININE 0.96 05/22/2024    GFRMALE 79 05/12/2023    CALCIUM 9.4 05/22/2024    ALBUMIN 4.4 05/22/2024        No results found for: \"BNP\", \"HGBA1C\"    No results found for this or any previous visit from the past 1095 days.    Assessment and Plan   Elevated CAC Score - stress MPI without clear e/o ischemia  Frequent PVC's, bigeminy  Severe septal hypertrophy on TTE - without mention of LVOT gradient  Borderline DL  Former tobacco use - quit 1995     Elevated CAC score > 2000, placing patient in a high risk group to have CV events.  He is otherwise asymptomatic with what sounds like a light to moderate level of exertion.     At this time I am recommending ongoing medical therapy for ASHD.  He is to let us know if he develops any angina/anginal equivalents.     Recs:  -trial rosuvastatin 20mg every other day  -to " have repeat FLP, can have his PCP Dr. Hernandez check this  -continue ASA 81mg daily  -discussed healthy cardiac lifestyle mods including mediterranean style diet, low sodium, patient and wife given AHA website for assistance with this - offered nutritionist referral if they should desire  -asymptomatic frequent PVC's with normal LVEF - with severe septal hypertrophy, I am recommending holter - they have deferred for now  -advise avoid dehydration with the above   -advised he discuss rash with his dermatologist, and if they clear him to do so, try a different brand of CoQ10           Dorys Bates RN

## 2024-10-22 NOTE — PATIENT INSTRUCTIONS
Thanks for following up in office today.    1)  I think that the appropriate dosing of rosuvastatin is 20 mg daily for you. For now you can take it every other day since you are telling me about aches and pains.  See your dermatologist soon.   Once your rash has subsided perhaps try another formulary of the CoQ10.  Your PCP can order labs to check your cholesterol.    2) If you develop chest pain or worsening shortness of breath with exertion then I would recommend that we do a heart catheterization.  Let me know if this happens.  If you have severe chest pain out of the blue call 911.     3)  Please continue your cardiac medications as prescribed.    Follow up with TOMY Nuñez NP  in 3 months  If you have any questions, please call [phone] and choose option for Dr. Maldonado's nurse Dorys Bates

## 2024-12-05 DIAGNOSIS — R93.1 ABNORMAL HEART SCORE CT: ICD-10-CM

## 2024-12-05 RX ORDER — ROSUVASTATIN CALCIUM 20 MG/1
20 TABLET, COATED ORAL EVERY OTHER DAY
Qty: 45 TABLET | Refills: 3 | Status: SHIPPED | OUTPATIENT
Start: 2024-12-05 | End: 2025-12-05

## 2025-01-06 ENCOUNTER — APPOINTMENT (OUTPATIENT)
Dept: PRIMARY CARE | Facility: CLINIC | Age: 80
End: 2025-01-06
Payer: MEDICARE

## 2025-01-08 ENCOUNTER — OFFICE VISIT (OUTPATIENT)
Dept: PRIMARY CARE | Facility: CLINIC | Age: 80
End: 2025-01-08
Payer: MEDICARE

## 2025-01-08 ENCOUNTER — LAB (OUTPATIENT)
Dept: LAB | Facility: LAB | Age: 80
End: 2025-01-08
Payer: MEDICARE

## 2025-01-08 VITALS
TEMPERATURE: 98.2 F | SYSTOLIC BLOOD PRESSURE: 138 MMHG | DIASTOLIC BLOOD PRESSURE: 68 MMHG | WEIGHT: 192.8 LBS | HEIGHT: 75 IN | HEART RATE: 46 BPM | BODY MASS INDEX: 23.97 KG/M2 | OXYGEN SATURATION: 98 %

## 2025-01-08 DIAGNOSIS — M89.9 DISORDER OF BONE, UNSPECIFIED: Primary | ICD-10-CM

## 2025-01-08 DIAGNOSIS — R20.2 PARESTHESIA OF BOTH FEET: Primary | ICD-10-CM

## 2025-01-08 DIAGNOSIS — R20.2 PARESTHESIA OF BOTH FEET: ICD-10-CM

## 2025-01-08 LAB
GLUCOSE SERPL-MCNC: 100 MG/DL (ref 74–99)
TSH SERPL-ACNC: 2.45 MIU/L (ref 0.44–3.98)
VIT B12 SERPL-MCNC: 246 PG/ML (ref 211–911)

## 2025-01-08 PROCEDURE — 1123F ACP DISCUSS/DSCN MKR DOCD: CPT | Performed by: PHYSICIAN ASSISTANT

## 2025-01-08 PROCEDURE — 99214 OFFICE O/P EST MOD 30 MIN: CPT | Performed by: PHYSICIAN ASSISTANT

## 2025-01-08 PROCEDURE — 82607 VITAMIN B-12: CPT

## 2025-01-08 PROCEDURE — 82947 ASSAY GLUCOSE BLOOD QUANT: CPT

## 2025-01-08 PROCEDURE — 82306 VITAMIN D 25 HYDROXY: CPT

## 2025-01-08 PROCEDURE — 1158F ADVNC CARE PLAN TLK DOCD: CPT | Performed by: PHYSICIAN ASSISTANT

## 2025-01-08 PROCEDURE — 1036F TOBACCO NON-USER: CPT | Performed by: PHYSICIAN ASSISTANT

## 2025-01-08 PROCEDURE — 1159F MED LIST DOCD IN RCRD: CPT | Performed by: PHYSICIAN ASSISTANT

## 2025-01-08 PROCEDURE — 84443 ASSAY THYROID STIM HORMONE: CPT

## 2025-01-08 PROCEDURE — 83036 HEMOGLOBIN GLYCOSYLATED A1C: CPT | Mod: ABN SERVICE ACCEPTED DO NOT BILL MEDICARE

## 2025-01-08 ASSESSMENT — PATIENT HEALTH QUESTIONNAIRE - PHQ9
SUM OF ALL RESPONSES TO PHQ9 QUESTIONS 1 AND 2: 0
2. FEELING DOWN, DEPRESSED OR HOPELESS: NOT AT ALL
1. LITTLE INTEREST OR PLEASURE IN DOING THINGS: NOT AT ALL

## 2025-01-08 NOTE — PROGRESS NOTES
"Subjective   Patient ID: Chencho Conn is a 79 y.o. male who presents for Numbness (Bottoms of both feet).    HPI  Pt presents with complaint of  numbness and tingling in the bottoms of both feet for about 6 or 8 months. Has no difficulty walking but bothers him most when he gets in bed at night. Sometimes he notices his sx's when he is sitting at his desk. Pt denies coldness of his feet - denies pain or weakness - denies pain in his LE's with ambulation. PT reports the numbness is only from the middle upper part of the bottom of his feet up to his toes - but only on the bottom of his feet.     Review of Systems  Constitutional: Negative.    HENT: Negative.     Respiratory: Negative.  Negative for cough, shortness of breath and wheezing.    Cardiovascular: Negative.  Negative for chest pain and palpitations.   Gastrointestinal: Negative.    Musculoskeletal: Negative.    Neurological: Negative  - + foot paresthesia b/l  Hematological: Negative.    Psychiatric/Behavioral: Negative.     All other systems reviewed and are negative    Objective   /68 (BP Location: Left arm, Patient Position: Sitting, BP Cuff Size: Adult)   Pulse (!) 46   Temp 36.8 °C (98.2 °F) (Oral)   Ht 1.905 m (6' 3\")   Wt 87.5 kg (192 lb 12.8 oz)   SpO2 98%   BMI 24.10 kg/m²     Physical Exam  Vitals and nursing note reviewed.   Constitutional:       General Appearance: Normal appearance, no distress, not ill-appearing.   HENT:      Head: Normocephalic and atraumatic.      Mouth/Throat:  MMM, Oropharynx is clear without erythema or exudate  Eyes:      Conjunctiva/sclera: Conjunctivae normal.   Cardiovascular:      Normal rate and regular rhythm: Normal heart sounds.   Pulmonary:      Pulmonary effort is normal. Normal breath sounds. No wheezing.   Musculoskeletal:         General: Normal range of motion.    Lymphadenopathy:      Cervical:  Supple, non-tender, no cervical adenopathy.   Skin:     General: Skin is warm and dry, no rash " or jaundice.    Neurological:      No focal deficit present. Alert and oriented to person, place, and time. +bottom of b/l feet with diminished sensation from mid shaft of metatarsals to toes. Pt's feet are warm and dry - no erythema or wounds present - he has 2+ pedal pulses present b/l  Psychiatric:         Mood and Affect: Mood normal.         Behavior: Behavior normal.         Thought Content: Thought content normal.         Judgment: Judgment normal.       Assessment/Plan  Pt presents with complaint of b/l numbness and tingling to the bottom of his feet - from middle of his feet to his toes b/l. He does not have a known diagnosis of DM nor has significant DDD /known arthritis in his back. Will check labs and will refer PT for EMG testing of his LE's. PT will follow up once testing is reviewed. Of note, he mentioned he will be traveling to [facility] from Feb thru March - he may have to defer testing until he returns - pt was advised in the meantime he needs to avoid trauma to his feet by wearing shoes /avoid barefoot walking as well as check his feet often/weekly for any wounds/pressure points, etc. We will follow up once testing is reviewed - sooner if he has any complications.     Diagnoses and all orders for this visit:  Paresthesia of both feet  -     EMG & nerve conduction; Future  -     Vitamin B12; Future  -     Glucose, random; Future  -     Hemoglobin A1C; Future  -     TSH; Future

## 2025-01-09 DIAGNOSIS — R20.2 PARESTHESIA OF BOTH FEET: Primary | ICD-10-CM

## 2025-01-09 DIAGNOSIS — M89.9 DISORDER OF BONE, UNSPECIFIED: ICD-10-CM

## 2025-01-09 LAB
25(OH)D3 SERPL-MCNC: 24 NG/ML (ref 30–100)
EST. AVERAGE GLUCOSE BLD GHB EST-MCNC: 114 MG/DL
HBA1C MFR BLD: 5.6 %

## 2025-01-09 NOTE — PROGRESS NOTES
Orders placed for testing for Vit D levels- order was cancelled when medicare entered. PT will be notified if we are unable to add it to labs.

## 2025-01-14 ENCOUNTER — OFFICE VISIT (OUTPATIENT)
Dept: PRIMARY CARE | Facility: CLINIC | Age: 80
End: 2025-01-14
Payer: MEDICARE

## 2025-01-14 VITALS
HEIGHT: 75 IN | TEMPERATURE: 97.6 F | SYSTOLIC BLOOD PRESSURE: 150 MMHG | WEIGHT: 192 LBS | DIASTOLIC BLOOD PRESSURE: 60 MMHG | BODY MASS INDEX: 23.87 KG/M2 | HEART RATE: 62 BPM | OXYGEN SATURATION: 99 %

## 2025-01-14 DIAGNOSIS — K52.9 ACUTE GASTROENTERITIS: Primary | ICD-10-CM

## 2025-01-14 PROCEDURE — 99213 OFFICE O/P EST LOW 20 MIN: CPT | Performed by: FAMILY MEDICINE

## 2025-01-14 PROCEDURE — 1158F ADVNC CARE PLAN TLK DOCD: CPT | Performed by: FAMILY MEDICINE

## 2025-01-14 PROCEDURE — 1159F MED LIST DOCD IN RCRD: CPT | Performed by: FAMILY MEDICINE

## 2025-01-14 PROCEDURE — 1123F ACP DISCUSS/DSCN MKR DOCD: CPT | Performed by: FAMILY MEDICINE

## 2025-01-14 PROCEDURE — 1036F TOBACCO NON-USER: CPT | Performed by: FAMILY MEDICINE

## 2025-01-14 RX ORDER — CHOLECALCIFEROL (VITAMIN D3) 25 MCG
1000 TABLET ORAL DAILY
COMMUNITY

## 2025-01-14 ASSESSMENT — PATIENT HEALTH QUESTIONNAIRE - PHQ9
1. LITTLE INTEREST OR PLEASURE IN DOING THINGS: NOT AT ALL
2. FEELING DOWN, DEPRESSED OR HOPELESS: NOT AT ALL
SUM OF ALL RESPONSES TO PHQ9 QUESTIONS 1 AND 2: 0

## 2025-01-14 NOTE — PROGRESS NOTES
"Subjective   Patient ID: Chencho Conn is a 79 y.o. male who presents for Abdominal Pain.    HPI C/o intermittent bilateral lower abdominal pain after vomiting four times on 1/3/2025. Denies any changes in bowel movements, denies fever/chills. Patient has history of Diverticulosis.     Review of Systems   All other systems reviewed and are negative.      Objective   /60   Pulse 62   Temp 36.4 °C (97.6 °F)   Ht 1.905 m (6' 3\")   Wt 87.1 kg (192 lb)   SpO2 99%   BMI 24.00 kg/m²     Physical Exam  Constitutional:       Appearance: Normal appearance.   Eyes:      Conjunctiva/sclera: Conjunctivae normal.   Cardiovascular:      Rate and Rhythm: Normal rate and regular rhythm.   Pulmonary:      Effort: Pulmonary effort is normal.      Breath sounds: Normal breath sounds.   Chest:      Chest wall: Tenderness present.   Abdominal:      Palpations: Abdomen is soft.   Musculoskeletal:         General: Normal range of motion.   Skin:     General: Skin is warm and dry.   Neurological:      Mental Status: He is alert.   Psychiatric:         Mood and Affect: Mood normal.         Assessment/Plan   Diagnoses and all orders for this visit:  Acute gastroenteritis        - Based on Sx described and the relative improvement over the past week, this appears to be consistent with Norovirus infection. He will continue to hydrate well and is not having difficulty with eating or keeping down. No blood in the stool or other issues.    RTC PRN     "

## 2025-04-18 NOTE — PROGRESS NOTES
Chief Complaint/Reason for Visit:   Patient is coming in today as a 6 month Cardiovascular follow up.      History Of Present Illness:    Mr. Conn is coming in today as a 6-month cardiovascular follow-up.  We have followed this patient previously for an abnormal coronary calcium score, frequent PVCs, abnormal echo, and borderline dyslipidemia.  Stress test in July, 2029 showed no evidence of ischemia.    Patient comes in today accompanied by his wife.  He has had no recent hospitalizations or emergency room visits.  He denies any chest pain, pressure, palpitations, orthopnea, or edema.  He took his blood pressure at home this morning and it was 133/75.    Past Medical History:  He has a past medical history of Other allergy status, other than to drugs and biological substances, Personal history of other diseases of the digestive system, and Prostate cancer (Multi).    Past Surgical History:  He has a past surgical history that includes Other surgical history (04/18/2022); Other surgical history (04/18/2022); Prostate surgery (04/18/2022); Hernia repair (04/18/2022); Cataract extraction (04/18/2022); Tonsillectomy (04/18/2022); Other surgical history (04/18/2022); and Other surgical history (04/18/2022).      Social History:  He reports that he has quit smoking. His smoking use included cigarettes. He has a 15 pack-year smoking history. He has never used smokeless tobacco. He reports current alcohol use of about 14.0 standard drinks of alcohol per week. He reports that he does not currently use drugs.    Family History:  Family History[1]     Allergies:  Amoxicillin and Statins-hmg-coa reductase inhibitors    Medications:  Current Outpatient Medications   Medication Instructions    aspirin 81 mg capsule 1 capsule, Every 24 hours    cholecalciferol (VITAMIN D3) 1,000 Units, Daily    diphenhydrAMINE-acetaminophen (Tylenol PM)  mg per tablet 1 tablet, Nightly PRN    multivitamin tablet 1 tablet, Daily RT     "rosuvastatin (CRESTOR) 20 mg, oral, Every other day    saccharomyces boulardii (FLORASTOR) 250 mg, 2 times daily    triamcinolone (Kenalog) 0.1 % cream 2 times daily       Review of Systems:  Review of Systems   Constitutional: Negative. Negative for decreased appetite and malaise/fatigue.   HENT: Negative.     Eyes:  Negative for blurred vision and visual disturbance.   Cardiovascular:  Negative for chest pain, dyspnea on exertion, irregular heartbeat, leg swelling, orthopnea, palpitations and syncope.   Respiratory: Negative.  Negative for cough and shortness of breath.    Musculoskeletal:  Negative for arthritis and falls.   Gastrointestinal: Negative.    Neurological:  Negative for focal weakness and light-headedness.   Psychiatric/Behavioral:  Negative for depression and memory loss.         Vitals  Visit Vitals  /86 (BP Location: Left arm, Patient Position: Sitting, BP Cuff Size: Adult)   Pulse 77   Ht 1.905 m (6' 3\")   Wt 85.7 kg (189 lb)   SpO2 100%   BMI 23.62 kg/m²   Smoking Status Former   BSA 2.13 m²        Physical Exam:  Physical Exam  Constitutional:       Appearance: Normal appearance.   HENT:      Head: Normocephalic.   Eyes:      Conjunctiva/sclera: Conjunctivae normal.   Cardiovascular:      Rate and Rhythm: Normal rate and regular rhythm.      Pulses: Normal pulses.      Heart sounds: S1 normal and S2 normal. No murmur heard.     No friction rub. No gallop.      Comments: Extrasystole noted  Pulmonary:      Effort: Pulmonary effort is normal.      Breath sounds: Normal breath sounds.   Abdominal:      General: Bowel sounds are normal.      Palpations: Abdomen is soft.      Tenderness: There is no abdominal tenderness.   Musculoskeletal:      Cervical back: Neck supple.      Right lower leg: No edema.      Left lower leg: No edema.   Skin:     General: Skin is warm and dry.   Neurological:      General: No focal deficit present.      Mental Status: He is alert and oriented to person, place, " and time.   Psychiatric:         Attention and Perception: Attention normal.         Mood and Affect: Mood normal.           Last Labs:  CBC -  Lab Results   Component Value Date    WBC 5.8 05/22/2024    HGB 15.5 05/22/2024    HCT 50.1 05/22/2024    MCV 93 05/22/2024     05/22/2024     Lab Results   Component Value Date    GLUCOSE 100 (H) 01/08/2025    CALCIUM 9.4 05/22/2024     05/22/2024    K 4.7 05/22/2024    CO2 26 05/22/2024     05/22/2024    BUN 20 05/22/2024    CREATININE 0.96 05/22/2024      CMP -  Lab Results   Component Value Date    CALCIUM 9.4 05/22/2024    PROT 6.8 05/22/2024    ALBUMIN 4.4 05/22/2024    AST 18 05/22/2024    ALT 17 05/22/2024    ALKPHOS 53 05/22/2024    BILITOT 0.5 05/22/2024       LIPID PANEL -   Lab Results   Component Value Date    CHOL 181 05/22/2024    TRIG 151 (H) 05/22/2024    HDL 40.3 05/22/2024    CHHDL 4.5 05/22/2024    LDLF 90 05/12/2023    VLDL 30 05/22/2024    NHDL 141 05/22/2024       Lab Results   Component Value Date    HGBA1C 5.6 01/08/2025       Last Cardiology Tests:    Echo:7-29-24  CONCLUSIONS:   1. The left ventricular systolic function is normal, with a Macdonald's biplane calculated ejection fraction of 58%.   2. Severely increased left ventricular septal thickness.   3. There is normal right ventricular global systolic function.    Stress Test:7-29-24  Normal exercise stress myocardial perfusion imaging. Mild inferior   attenuation seen in the supine images that improves on prone imaging,   likely soft tissue attenuation.   Summary:   1. Adequate level of stress achieved.   2. Correlate with myocardial perfusion imaging results.   3. No clinical or electrocardiographic evidence for ischemia at maximal workload.   4. Nuclear image results are reported separately.    Lab review: I have personally reviewed the laboratory result(s)     Assessment/Plan:  Elevated coronary calcium score: Patient is angina class 0.  Stress test from July, 2024 showed  no EKG changes low probability of ischemia.  He should continue on low-dose aspirin and rosuvastatin.  He will be due for repeat lipid labs in May.    Dyslipidemia: Lipid labs from May, 2024 shows an LDL of 111, triglycerides 151.  Patient currently is on rosuvastatin 20 mg every other day.  He will be getting fasting blood work with his annual visit with his PCP in May.  We would like to see his LDL better controlled.    Elevated blood pressure without the diagnosis of hypertension: Patient's office blood pressure today was 140/86.  It looks like he tends to be elevated in the office setting.  Home blood pressure this morning was 133/75.  I recommended that the patient take his blood pressure 3 days a week until he follows up with his PCP and may.  If he is not running at or near goal of 130/80 or less consistently consideration should be made for adding antihypertensive medications.  Patient was encouraged to eat a heart healthy low-sodium diet and continue to exercise.    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  He is asymptomatic.  Stress test showed low probability of ischemia.    Patient will follow-up with Dr. Maldonado in 6 months.  Patient instructed to call with any cardiovascular complaints. All questions were answered.       Dragon dictation was utilized to create this document. Quite often unanticipated grammatical, syntax,  and other interpretive errors are inadvertently transcribed by the computer software.  Please disregard these errors.  Please excuse any errors that have escaped final proofreading.      Brittany Nuñez, APRN-CNP         [1]   Family History  Problem Relation Name Age of Onset    Colon cancer Mother

## 2025-04-22 ENCOUNTER — OFFICE VISIT (OUTPATIENT)
Dept: CARDIOLOGY | Facility: HOSPITAL | Age: 80
End: 2025-04-22
Payer: MEDICARE

## 2025-04-22 VITALS
HEIGHT: 75 IN | HEART RATE: 77 BPM | DIASTOLIC BLOOD PRESSURE: 86 MMHG | SYSTOLIC BLOOD PRESSURE: 140 MMHG | OXYGEN SATURATION: 100 % | WEIGHT: 189 LBS | BODY MASS INDEX: 23.5 KG/M2

## 2025-04-22 DIAGNOSIS — R93.1 ELEVATED CORONARY ARTERY CALCIUM SCORE: ICD-10-CM

## 2025-04-22 DIAGNOSIS — R03.0 BLOOD PRESSURE ELEVATED WITHOUT HISTORY OF HTN: Primary | ICD-10-CM

## 2025-04-22 DIAGNOSIS — I49.3 FREQUENT PVCS: ICD-10-CM

## 2025-04-22 DIAGNOSIS — E78.5 DYSLIPIDEMIA: ICD-10-CM

## 2025-04-22 DIAGNOSIS — I11.9 BENIGN HYPERTENSIVE HEART DISEASE WITHOUT HEART FAILURE: ICD-10-CM

## 2025-04-22 DIAGNOSIS — R93.1 ABNORMAL HEART SCORE CT: ICD-10-CM

## 2025-04-22 PROCEDURE — 1159F MED LIST DOCD IN RCRD: CPT | Performed by: NURSE PRACTITIONER

## 2025-04-22 PROCEDURE — 1036F TOBACCO NON-USER: CPT | Performed by: NURSE PRACTITIONER

## 2025-04-22 PROCEDURE — 1160F RVW MEDS BY RX/DR IN RCRD: CPT | Performed by: NURSE PRACTITIONER

## 2025-04-22 PROCEDURE — 99213 OFFICE O/P EST LOW 20 MIN: CPT | Performed by: NURSE PRACTITIONER

## 2025-04-22 RX ORDER — BUTYROSPERMUM PARKII(SHEA BUTTER), SIMMONDSIA CHINENSIS (JOJOBA) SEED OIL, ALOE BARBADENSIS LEAF EXTRACT .01; 1; 3.5 G/100G; G/100G; G/100G
250 LIQUID TOPICAL 2 TIMES DAILY
COMMUNITY

## 2025-04-22 ASSESSMENT — ENCOUNTER SYMPTOMS
CONSTITUTIONAL NEGATIVE: 1
LIGHT-HEADEDNESS: 0
DECREASED APPETITE: 0
MEMORY LOSS: 0
FALLS: 0
IRREGULAR HEARTBEAT: 0
PALPITATIONS: 0
DYSPNEA ON EXERTION: 0
SYNCOPE: 0
GASTROINTESTINAL NEGATIVE: 1
SHORTNESS OF BREATH: 0
RESPIRATORY NEGATIVE: 1
FOCAL WEAKNESS: 0
BLURRED VISION: 0
ORTHOPNEA: 0
DEPRESSION: 0
COUGH: 0

## 2025-04-22 NOTE — PATIENT INSTRUCTIONS
Continue on current meds  Heart healthy, low sodium diet  Home BP readings 3 days per week:  Goal is 130/80 or less  Mediterranean diet is recommended  Follow up with Dr Maldonado in 6 months

## 2025-04-24 ENCOUNTER — TELEPHONE (OUTPATIENT)
Dept: PRIMARY CARE | Facility: CLINIC | Age: 80
End: 2025-04-24
Payer: MEDICARE

## 2025-04-24 DIAGNOSIS — Z79.899 MEDICATION MANAGEMENT: ICD-10-CM

## 2025-04-24 DIAGNOSIS — Z85.46 HISTORY OF PROSTATE CANCER: ICD-10-CM

## 2025-04-24 DIAGNOSIS — Z13.220 SCREENING FOR LIPID DISORDERS: ICD-10-CM

## 2025-04-24 DIAGNOSIS — Z00.00 HEALTHCARE MAINTENANCE: ICD-10-CM

## 2025-05-01 ENCOUNTER — HOSPITAL ENCOUNTER (OUTPATIENT)
Dept: NEUROLOGY | Facility: HOSPITAL | Age: 80
Discharge: HOME | End: 2025-05-01
Payer: MEDICARE

## 2025-05-01 DIAGNOSIS — R20.2 PARESTHESIA OF BOTH FEET: ICD-10-CM

## 2025-05-01 PROCEDURE — 95886 MUSC TEST DONE W/N TEST COMP: CPT | Performed by: PSYCHIATRY & NEUROLOGY

## 2025-05-01 PROCEDURE — 95911 NRV CNDJ TEST 9-10 STUDIES: CPT | Performed by: PSYCHIATRY & NEUROLOGY

## 2025-05-19 LAB
ALBUMIN SERPL-MCNC: 4.4 G/DL (ref 3.6–5.1)
ALP SERPL-CCNC: 57 U/L (ref 35–144)
ALT SERPL-CCNC: 12 U/L (ref 9–46)
ANION GAP SERPL CALCULATED.4IONS-SCNC: 11 MMOL/L (CALC) (ref 7–17)
AST SERPL-CCNC: 14 U/L (ref 10–35)
BILIRUB SERPL-MCNC: 0.8 MG/DL (ref 0.2–1.2)
BUN SERPL-MCNC: 22 MG/DL (ref 7–25)
CALCIUM SERPL-MCNC: 9.5 MG/DL (ref 8.6–10.3)
CHLORIDE SERPL-SCNC: 107 MMOL/L (ref 98–110)
CHOLEST SERPL-MCNC: 114 MG/DL
CHOLEST/HDLC SERPL: 2.5 (CALC)
CO2 SERPL-SCNC: 23 MMOL/L (ref 20–32)
CREAT SERPL-MCNC: 1.04 MG/DL (ref 0.7–1.28)
EGFRCR SERPLBLD CKD-EPI 2021: 73 ML/MIN/1.73M2
ERYTHROCYTE [DISTWIDTH] IN BLOOD BY AUTOMATED COUNT: 12.7 % (ref 11–15)
GLUCOSE SERPL-MCNC: 72 MG/DL (ref 65–99)
HCT VFR BLD AUTO: 47.5 % (ref 38.5–50)
HDLC SERPL-MCNC: 45 MG/DL
HGB BLD-MCNC: 15.3 G/DL (ref 13.2–17.1)
LDLC SERPL CALC-MCNC: 52 MG/DL (CALC)
MCH RBC QN AUTO: 28.5 PG (ref 27–33)
MCHC RBC AUTO-ENTMCNC: 32.2 G/DL (ref 32–36)
MCV RBC AUTO: 88.6 FL (ref 80–100)
NONHDLC SERPL-MCNC: 69 MG/DL (CALC)
PLATELET # BLD AUTO: 225 THOUSAND/UL (ref 140–400)
PMV BLD REES-ECKER: 10.7 FL (ref 7.5–12.5)
POTASSIUM SERPL-SCNC: 4.8 MMOL/L (ref 3.5–5.3)
PROT SERPL-MCNC: 6.7 G/DL (ref 6.1–8.1)
PSA SERPL-MCNC: 0.05 NG/ML
RBC # BLD AUTO: 5.36 MILLION/UL (ref 4.2–5.8)
SODIUM SERPL-SCNC: 141 MMOL/L (ref 135–146)
TRIGL SERPL-MCNC: 87 MG/DL
WBC # BLD AUTO: 9.6 THOUSAND/UL (ref 3.8–10.8)

## 2025-06-03 ENCOUNTER — APPOINTMENT (OUTPATIENT)
Dept: PRIMARY CARE | Facility: CLINIC | Age: 80
End: 2025-06-03
Payer: MEDICARE

## 2025-06-03 VITALS
HEART RATE: 71 BPM | HEIGHT: 75 IN | BODY MASS INDEX: 23.62 KG/M2 | SYSTOLIC BLOOD PRESSURE: 138 MMHG | OXYGEN SATURATION: 97 % | DIASTOLIC BLOOD PRESSURE: 80 MMHG | WEIGHT: 190 LBS | TEMPERATURE: 98.4 F

## 2025-06-03 DIAGNOSIS — I10 HYPERTENSION, UNSPECIFIED TYPE: ICD-10-CM

## 2025-06-03 DIAGNOSIS — Z00.00 ROUTINE GENERAL MEDICAL EXAMINATION AT A HEALTH CARE FACILITY: Primary | ICD-10-CM

## 2025-06-03 PROCEDURE — 1170F FXNL STATUS ASSESSED: CPT | Performed by: FAMILY MEDICINE

## 2025-06-03 PROCEDURE — 1036F TOBACCO NON-USER: CPT | Performed by: FAMILY MEDICINE

## 2025-06-03 PROCEDURE — 1159F MED LIST DOCD IN RCRD: CPT | Performed by: FAMILY MEDICINE

## 2025-06-03 PROCEDURE — 3079F DIAST BP 80-89 MM HG: CPT | Performed by: FAMILY MEDICINE

## 2025-06-03 PROCEDURE — 3075F SYST BP GE 130 - 139MM HG: CPT | Performed by: FAMILY MEDICINE

## 2025-06-03 PROCEDURE — 99213 OFFICE O/P EST LOW 20 MIN: CPT | Performed by: FAMILY MEDICINE

## 2025-06-03 PROCEDURE — 99397 PER PM REEVAL EST PAT 65+ YR: CPT | Performed by: FAMILY MEDICINE

## 2025-06-03 PROCEDURE — G0439 PPPS, SUBSEQ VISIT: HCPCS | Performed by: FAMILY MEDICINE

## 2025-06-03 RX ORDER — AMLODIPINE BESYLATE 5 MG/1
5 TABLET ORAL DAILY
Qty: 30 TABLET | Refills: 2 | Status: SHIPPED | OUTPATIENT
Start: 2025-06-03 | End: 2025-09-01

## 2025-06-03 ASSESSMENT — ENCOUNTER SYMPTOMS
DEPRESSION: 0
LOSS OF SENSATION IN FEET: 0
OCCASIONAL FEELINGS OF UNSTEADINESS: 0

## 2025-06-03 ASSESSMENT — ACTIVITIES OF DAILY LIVING (ADL)
MANAGING_FINANCES: INDEPENDENT
TAKING_MEDICATION: INDEPENDENT
BATHING: INDEPENDENT
GROCERY_SHOPPING: INDEPENDENT
DRESSING: INDEPENDENT
DOING_HOUSEWORK: INDEPENDENT

## 2025-06-03 NOTE — PROGRESS NOTES
"Subjective   Reason for Visit: Chencho Conn is an 79 y.o. male here for a Medicare Wellness visit. Also requesting to go over nerve conduction study results.          Reviewed all medications by prescribing practitioner or clinical pharmacist (such as prescriptions, OTCs, herbal therapies and supplements) and documented in the medical record.    HPI Patient had fasting labs drawn prior to office visit. Patient's mother had colon cancer. Patient's last colonoscopy was done June of 2023.     Patient Care Team:  Cihp Hernandez DO as PCP - General  Chip Hernandez DO as PCP - [employer] PCP     Review of Systems    Objective   Vitals:  /80   Pulse 71   Temp 36.9 °C (98.4 °F)   Ht 1.905 m (6' 3\")   Wt 86.2 kg (190 lb)   SpO2 97%   BMI 23.75 kg/m²       Physical Exam    Assessment & Plan  Routine general medical examination at a health care facility  Reviewed all labs with pt -- no issues. Remainder of exam was unremarkable.       Hypertension, unspecified type    Orders:    amLODIPine (Norvasc) 5 mg tablet; Take 1 tablet (5 mg) by mouth once daily.              "

## 2025-06-05 ENCOUNTER — APPOINTMENT (OUTPATIENT)
Dept: PRIMARY CARE | Facility: CLINIC | Age: 80
End: 2025-06-05
Payer: MEDICARE

## 2025-07-11 ENCOUNTER — APPOINTMENT (OUTPATIENT)
Dept: PRIMARY CARE | Facility: CLINIC | Age: 80
End: 2025-07-11
Payer: MEDICARE

## 2025-07-11 DIAGNOSIS — M54.16 CHRONIC LUMBAR RADICULOPATHY: Primary | ICD-10-CM

## 2025-07-11 PROCEDURE — 99214 OFFICE O/P EST MOD 30 MIN: CPT | Performed by: FAMILY MEDICINE

## 2025-07-11 PROCEDURE — 1159F MED LIST DOCD IN RCRD: CPT | Performed by: FAMILY MEDICINE

## 2025-07-11 ASSESSMENT — PATIENT HEALTH QUESTIONNAIRE - PHQ9: 1. LITTLE INTEREST OR PLEASURE IN DOING THINGS: NOT AT ALL

## 2025-07-11 NOTE — PROGRESS NOTES
Subjective   Patient ID: Chencho Conn is a 79 y.o. male who presents for follow up of BL LE paresthesia.    HPI Saw Chasity Brenner on 25 with complaints of b/l numbness and tingling to the bottom of his feet - from middle of his feet to his toes b/l. He does not have a known diagnosis of DM nor has significant DDD /known arthritis in his back. Plan was to check labs and refer PT for EMG testing of his LE's. PT was to follow up once testing is reviewed. EMG was done on 25 showin. Chronic, bilateral lower lumbar radiculopathy, at least moderate in severity, left slightly worse than right, with active denervation found in right medial gastrocnemius muscle  2. Mildly slow conduction velocities in bilateral superficial peroneal sensory responses are of uncertain significance.     Mild degree of large fiber polyneuropathy cannot be completely excluded, though sensory findings in BLE may also be related to aging.     There is no definite electrodiagnostic evidence of bilateral sciatic neuropathy and/or myopathy at this time.    Review of Systems   All other systems reviewed and are negative.      Objective   There were no vitals taken for this visit.    Physical Exam  Constitutional:       Appearance: Normal appearance.   Eyes:      Conjunctiva/sclera: Conjunctivae normal.   Cardiovascular:      Rate and Rhythm: Normal rate and regular rhythm.   Pulmonary:      Effort: Pulmonary effort is normal.      Breath sounds: Normal breath sounds.   Abdominal:      Palpations: Abdomen is soft.   Musculoskeletal:         General: Normal range of motion.   Skin:     General: Skin is warm and dry.   Neurological:      Mental Status: He is alert.   Psychiatric:         Mood and Affect: Mood normal.         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 Discussed  needing imaging (Xray vs MRI). For now patient prefers to hold. States Sx have improved. Will follow at his apt in the office in Aug 2025.

## 2025-08-15 ASSESSMENT — ENCOUNTER SYMPTOMS
HYPERTENSION: 1
NECK PAIN: 1

## 2025-08-18 ENCOUNTER — APPOINTMENT (OUTPATIENT)
Dept: PRIMARY CARE | Facility: CLINIC | Age: 80
End: 2025-08-18
Payer: MEDICARE

## 2025-08-18 VITALS
HEIGHT: 75 IN | HEART RATE: 80 BPM | WEIGHT: 189 LBS | SYSTOLIC BLOOD PRESSURE: 140 MMHG | BODY MASS INDEX: 23.5 KG/M2 | TEMPERATURE: 97.7 F | OXYGEN SATURATION: 98 % | DIASTOLIC BLOOD PRESSURE: 60 MMHG

## 2025-08-18 DIAGNOSIS — G89.29 CHRONIC BILATERAL LOW BACK PAIN WITH BILATERAL SCIATICA: ICD-10-CM

## 2025-08-18 DIAGNOSIS — I10 HYPERTENSION, UNSPECIFIED TYPE: ICD-10-CM

## 2025-08-18 DIAGNOSIS — R20.2 PARESTHESIA OF BOTH FEET: ICD-10-CM

## 2025-08-18 DIAGNOSIS — M54.42 CHRONIC BILATERAL LOW BACK PAIN WITH BILATERAL SCIATICA: ICD-10-CM

## 2025-08-18 DIAGNOSIS — M54.16 CHRONIC LUMBAR RADICULOPATHY: Primary | ICD-10-CM

## 2025-08-18 DIAGNOSIS — M54.41 CHRONIC BILATERAL LOW BACK PAIN WITH BILATERAL SCIATICA: ICD-10-CM

## 2025-08-18 PROCEDURE — 99215 OFFICE O/P EST HI 40 MIN: CPT | Performed by: FAMILY MEDICINE

## 2025-08-18 PROCEDURE — 3078F DIAST BP <80 MM HG: CPT | Performed by: FAMILY MEDICINE

## 2025-08-18 PROCEDURE — 1159F MED LIST DOCD IN RCRD: CPT | Performed by: FAMILY MEDICINE

## 2025-08-18 PROCEDURE — 3077F SYST BP >= 140 MM HG: CPT | Performed by: FAMILY MEDICINE

## 2025-08-18 RX ORDER — AMLODIPINE BESYLATE 5 MG/1
5 TABLET ORAL DAILY
Qty: 90 TABLET | Refills: 1 | Status: SHIPPED | OUTPATIENT
Start: 2025-08-18 | End: 2026-02-14

## 2025-08-18 ASSESSMENT — ENCOUNTER SYMPTOMS
NECK PAIN: 1
HYPERTENSION: 1

## 2025-08-20 ENCOUNTER — HOSPITAL ENCOUNTER (OUTPATIENT)
Dept: RADIOLOGY | Facility: CLINIC | Age: 80
Discharge: HOME | End: 2025-08-20
Payer: MEDICARE

## 2025-08-20 DIAGNOSIS — R20.2 PARESTHESIA OF BOTH FEET: ICD-10-CM

## 2025-08-20 DIAGNOSIS — M54.42 CHRONIC BILATERAL LOW BACK PAIN WITH BILATERAL SCIATICA: ICD-10-CM

## 2025-08-20 DIAGNOSIS — M54.41 CHRONIC BILATERAL LOW BACK PAIN WITH BILATERAL SCIATICA: ICD-10-CM

## 2025-08-20 DIAGNOSIS — G89.29 CHRONIC BILATERAL LOW BACK PAIN WITH BILATERAL SCIATICA: ICD-10-CM

## 2025-08-20 DIAGNOSIS — M54.16 CHRONIC LUMBAR RADICULOPATHY: ICD-10-CM

## 2025-08-20 PROCEDURE — 72110 X-RAY EXAM L-2 SPINE 4/>VWS: CPT

## 2025-08-20 PROCEDURE — 72110 X-RAY EXAM L-2 SPINE 4/>VWS: CPT | Performed by: RADIOLOGY

## 2025-08-21 ENCOUNTER — TELEPHONE (OUTPATIENT)
Dept: CARDIOLOGY | Facility: HOSPITAL | Age: 80
End: 2025-08-21
Payer: MEDICARE

## 2025-08-29 ENCOUNTER — EVALUATION (OUTPATIENT)
Dept: PHYSICAL THERAPY | Facility: CLINIC | Age: 80
End: 2025-08-29
Payer: MEDICARE

## 2025-08-29 DIAGNOSIS — R20.2 PARESTHESIA OF BOTH FEET: ICD-10-CM

## 2025-08-29 DIAGNOSIS — M54.41 CHRONIC BILATERAL LOW BACK PAIN WITH BILATERAL SCIATICA: ICD-10-CM

## 2025-08-29 DIAGNOSIS — M54.42 CHRONIC BILATERAL LOW BACK PAIN WITH BILATERAL SCIATICA: ICD-10-CM

## 2025-08-29 DIAGNOSIS — G89.29 CHRONIC BILATERAL LOW BACK PAIN WITH BILATERAL SCIATICA: ICD-10-CM

## 2025-08-29 DIAGNOSIS — M54.16 CHRONIC LUMBAR RADICULOPATHY: Primary | ICD-10-CM

## 2025-08-29 PROCEDURE — 97110 THERAPEUTIC EXERCISES: CPT | Mod: GP

## 2025-08-29 PROCEDURE — 97161 PT EVAL LOW COMPLEX 20 MIN: CPT | Mod: GP

## 2025-10-21 ENCOUNTER — APPOINTMENT (OUTPATIENT)
Dept: CARDIOLOGY | Facility: HOSPITAL | Age: 80
End: 2025-10-21
Payer: MEDICARE